# Patient Record
Sex: FEMALE | URBAN - METROPOLITAN AREA
[De-identification: names, ages, dates, MRNs, and addresses within clinical notes are randomized per-mention and may not be internally consistent; named-entity substitution may affect disease eponyms.]

---

## 2022-11-05 ENCOUNTER — NURSE TRIAGE (OUTPATIENT)
Dept: NURSING | Facility: CLINIC | Age: 22
End: 2022-11-05

## 2022-11-05 NOTE — TELEPHONE ENCOUNTER
Pt is calling.    Possible allergic reaction to something vs bug bites.  Small little raised bites noticed yesterday. This AM, the red small bumps have spread down her legs after she washed her sheets last night, and now woke up with a worsening rash today.  Red and blotchy. Rash itches, slightly swollen.  Rash is mostly in exposed areas but mostly on her legs. Scalp and back of ears itch as well.  No lice, or bugs seen.  No recent illness, fevers.  No new soaps, lotions, detergents.    Care advice reviewed. When to call back reviewed per care advice. I advised her that if care advice is not helping, or rash is worsening, then she should be seen in clinic. She verbalized understanding and agreed to follow care advice given.        Kayla Kate RN  [facility] Nurse Advisor  11/5/2022 at 1:20 PM        Reason for Disposition    Mild widespread rash    Diagnosis of bed bug bites is uncertain    Additional Information    Negative: [1] Life-threatening reaction (anaphylaxis) in the past to similar substance (e.g., food, insect bite/sting, chemical, etc.) AND [2] < 2 hours since exposure    Negative: [1] Sudden onset of rash (within last 2 hours) AND [2] difficulty breathing or swallowing    Negative: Shock suspected (e.g., cold/pale/clammy skin, too weak to stand, low BP, rapid pulse)    Negative: Difficult to awaken or acting confused (e.g., disoriented, slurred speech)    Negative: [1] Purple or blood-colored spots or dots AND [2] fever    Negative: Sounds like a life-threatening emergency to the triager    Negative: Insect bites suspected    Negative: Swimmer's Itch suspected    Negative: Sunburn suspected    Negative: Hives suspected    Negative: Measles suspected AND [2] known exposure to measles in past 3 weeks    Negative: [1] Chickenpox suspected AND [2] known exposure to chickenpox in past 3 weeks    Negative: [1] Drug rash suspected AND [2] started taking new medicine within last 2 weeks (Exception:  "antihistamine, eye drops, ear drops, decongestant or other OTC cough/cold medicines)    Negative: [1] Bright red, sunburn-like rash AND [2] current tampon use or nasal packing    Negative: [1] Bright red, sunburn-like rash AND [3] wound infection or recent surgery    Negative: [1] Bright red skin AND [2] peels off in sheets    Negative: Stiff neck (can't touch chin to chest)    Negative: Fever    Negative: Joint pain or swelling    Negative: Patient sounds very sick or weak to the triager    Negative: [1] Purple or blood-colored rash (spots or dots) AND [2] no fever AND [3] sounds well to triager    Negative: Large or small blisters on skin (i.e., fluid filled bubbles or sacs)    Negative: Bloody crusts on lips or sores in mouth    Negative: Face becomes swollen    Negative: [1] Headache AND [2] no fever    Negative: SEVERE itching (i.e., interferes with sleep, normal activities or school)    Negative: Sore throat    Negative: Ring-like appearance of rash (or ask: does it look like a  \"target\" or \"bulls- eye\")    Negative: Pregnant    Negative: Anaphylactic reaction suspected (e.g., sudden onset of difficulty breathing, difficulty swallowing, wheezing following bite)    Negative: Sounds like a life-threatening emergency to the triager    Negative: Widespread hives    Negative: Impetigo suspected (i.e., painless infected superficial small sores, < 1 inch or 2.5 cm, often covered by a soft, yellow-brown scab or crust; sometimes occurring near nasal openings)    Negative: Other insect bite suspected    Negative: Doesn't match the SYMPTOMS of bed bug bites    Negative: Patient sounds very sick or weak to the triager    Negative: [1] Fever AND [2] red area    Negative: [1] Fever AND [2] area is very tender to touch    Negative: [1] Red streak or red line AND [2] length > 2 inches (5 cm)    Negative: [1] Red or very tender (to touch) area AND [2] started over 24 hours after the bite    Negative: [1] Red or very tender (to " touch) area AND [2] getting larger over 48 hours after the bite    Negative: [1] SEVERE local itching (i.e., interferes with work, school, sleep) AND [2] not improved after 24 hours of hydrocortisone cream    Negative: [1] After 7 days AND [2] rash from bed bug bites is not improving    Negative: [1] After 14 days AND [2] bed bug bite isn't healed    Protocols used: RASH OR REDNESS - WIDESPREAD-A-AH, BED BUG BITE-A-AH